# Patient Record
Sex: MALE | Race: WHITE | NOT HISPANIC OR LATINO | URBAN - METROPOLITAN AREA
[De-identification: names, ages, dates, MRNs, and addresses within clinical notes are randomized per-mention and may not be internally consistent; named-entity substitution may affect disease eponyms.]

---

## 2017-04-26 ENCOUNTER — TRANSCRIBE ORDERS (OUTPATIENT)
Dept: ADMINISTRATIVE | Facility: HOSPITAL | Age: 82
End: 2017-04-26

## 2017-04-26 DIAGNOSIS — R91.8 LUNG MASS: Primary | ICD-10-CM

## 2017-05-03 ENCOUNTER — HOSPITAL ENCOUNTER (OUTPATIENT)
Dept: CT IMAGING | Facility: HOSPITAL | Age: 82
Discharge: HOME | End: 2017-05-03
Attending: RADIOLOGY | Admitting: RADIOLOGY

## 2017-05-03 ENCOUNTER — TELEPHONE (OUTPATIENT)
Dept: INTERVENTIONAL RADIOLOGY/VASCULAR | Facility: HOSPITAL | Age: 82
End: 2017-05-03

## 2017-05-03 VITALS — HEIGHT: 67 IN | BODY MASS INDEX: 27.28 KG/M2 | WEIGHT: 173.8 LBS

## 2017-06-26 ENCOUNTER — OFFICE VISIT (OUTPATIENT)
Dept: CARDIAC SURGERY | Facility: CLINIC | Age: 82
End: 2017-06-26

## 2017-06-26 VITALS
SYSTOLIC BLOOD PRESSURE: 120 MMHG | WEIGHT: 169.4 LBS | BODY MASS INDEX: 26.59 KG/M2 | OXYGEN SATURATION: 97 % | HEART RATE: 70 BPM | DIASTOLIC BLOOD PRESSURE: 47 MMHG | TEMPERATURE: 98.1 F | HEIGHT: 67 IN

## 2017-06-26 DIAGNOSIS — R91.8 MASS OF RIGHT LUNG: Primary | ICD-10-CM

## 2017-06-26 PROCEDURE — 99203 OFFICE O/P NEW LOW 30 MIN: CPT | Performed by: THORACIC SURGERY (CARDIOTHORACIC VASCULAR SURGERY)

## 2017-06-26 RX ORDER — AMLODIPINE BESYLATE 10 MG/1
10 TABLET ORAL DAILY
COMMUNITY

## 2017-06-26 RX ORDER — ALLOPURINOL 100 MG/1
100 TABLET ORAL DAILY
COMMUNITY

## 2017-06-26 RX ORDER — PANTOPRAZOLE SODIUM 40 MG/1
40 TABLET, DELAYED RELEASE ORAL DAILY
COMMUNITY

## 2017-06-26 RX ORDER — ATORVASTATIN CALCIUM 40 MG/1
20 TABLET, FILM COATED ORAL DAILY
COMMUNITY

## 2017-06-26 RX ORDER — LEVOTHYROXINE SODIUM 0.07 MG/1
75 TABLET ORAL DAILY
COMMUNITY

## 2017-06-26 RX ORDER — ERGOCALCIFEROL 1.25 MG/1
50000 CAPSULE ORAL WEEKLY
COMMUNITY

## 2017-06-26 NOTE — PROGRESS NOTES
06/26/2017  Patient Information  Keny Krishnamurthy                                                                                          [address]  [place] [address]   1934  'PCP/Referring Physician'  No Known Provider  None  No ref. provider found    Chief Complaint   Patient presents with   • Lung Nodule     Referred by Dr. Hardin for pulmonary mass       History of Present Illness:  The patient is an 83 year old male who done recently presented to Dr. Hardin for iron deficiency anemia and was seen by Dr. Anguiano also on referral.  He saw Dr. Anguiano and underwent a biopsy which was nondiagnostic.  He has been a long-term smoker but has been quit for about 20 years.  He is been referred to me for  further workup and evaluation of his right lung mass.  He denies hemoptysis.  He has slight shortness breath.  He has no cough.      Patient Active Problem List   Diagnosis   • Mass of right lung     Past Medical History:   Diagnosis Date   • Arthritis    • COPD (chronic obstructive pulmonary disease)    • Disease of thyroid gland    • Hyperlipidemia    • Hypertension    • Pneumonia    • Skin cancer    • Stroke      Past Surgical History:   Procedure Laterality Date   • AORTIC VALVE SURGERY     • CHOLECYSTECTOMY         Current Outpatient Prescriptions:   •  allopurinol (ZYLOPRIM) 100 MG tablet, Take 100 mg by mouth Daily., Disp: , Rfl:   •  amLODIPine (NORVASC) 10 MG tablet, Take 10 mg by mouth Daily., Disp: , Rfl:   •  atorvastatin (LIPITOR) 40 MG tablet, Take 20 mg by mouth Daily., Disp: , Rfl:   •  levothyroxine (SYNTHROID, LEVOTHROID) 75 MCG tablet, Take 75 mcg by mouth Daily., Disp: , Rfl:   •  pantoprazole (PROTONIX) 40 MG EC tablet, Take 40 mg by mouth Daily., Disp: , Rfl:   •  vitamin D (ERGOCALCIFEROL) 36407 UNITS capsule capsule, Take 50,000 Units by mouth 1 (One) Time Per Week., Disp: , Rfl:   No Known Allergies  Social History     Social History   • Marital status:      Spouse name: N/A   •  Number of children: 6   • Years of education: N/A     Occupational History   • [employer] Retired     Social History Main Topics   • Smoking status: Former Smoker     Packs/day: 1.00     Years: 20.00     Types: Cigarettes     Quit date: 6/26/1997   • Smokeless tobacco: Never Used   • Alcohol use No      Comment: Quit 15 years ago   • Drug use: No   • Sexual activity: Not on file     Other Topics Concern   • Not on file     Social History Narrative    Lives in [place], [place] outside of [facility]     Family History   Problem Relation Age of Onset   • Heart attack Mother    • Leukemia Father    • Cancer Brother      Review of Systems   Constitution: Positive for weakness and weight loss (13 lbs). Negative for chills, fever, malaise/fatigue and night sweats.   HENT: Positive for hearing loss. Negative for headaches, odynophagia and sore throat.    Cardiovascular: Positive for dyspnea on exertion and leg swelling. Negative for chest pain, orthopnea and palpitations.   Respiratory: Positive for cough and shortness of breath. Negative for hemoptysis.    Endocrine: Negative for cold intolerance, heat intolerance, polydipsia, polyphagia and polyuria.   Hematologic/Lymphatic: Bruises/bleeds easily.   Skin: Negative for itching and rash.   Musculoskeletal: Negative for joint pain, joint swelling and myalgias.   Gastrointestinal: Negative for abdominal pain, constipation, diarrhea, hematemesis, hematochezia, melena, nausea and vomiting.   Genitourinary: Negative for dysuria, frequency and hematuria.   Neurological: Positive for dizziness. Negative for focal weakness, numbness and seizures.   Psychiatric/Behavioral: Negative for depression and suicidal ideas. The patient is not nervous/anxious.    All other systems reviewed and are negative.    Vitals:    06/26/17 1429   BP: 120/47   BP Location: Right arm   Patient Position: Sitting   Pulse: 70   Temp: 98.1 °F (36.7 °C)   SpO2: 97%   Weight: 169 lb 6.4 oz (76.8 kg)  "  Height: 67\" (170.2 cm)      Physical Exam   Constitutional: He is oriented to person, place, and time. He appears well-developed and well-nourished. No distress.   HENT:   Head: Normocephalic.   Eyes: Conjunctivae and EOM are normal. Pupils are equal, round, and reactive to light.   Neck: Normal range of motion. Carotid bruit is not present. No thyroid mass present.   Cardiovascular: Normal rate.  Exam reveals no gallop and no friction rub.    No murmur heard.  Pulmonary/Chest: Effort normal. He has no wheezes. He has no rales. He exhibits no tenderness.   Abdominal: Soft. He exhibits no distension. There is no tenderness.   Musculoskeletal: Normal range of motion.   Neurological: He is alert and oriented to person, place, and time.   Skin: Skin is warm and dry. No rash noted.   Psychiatric: He has a normal mood and affect.       Labs/Imaging:  I obtained and reviewed medical records from Dr. Hardin's office including the PET scan and records from Dr. Anguiano's office.    Assessment/Plan:   The patient is an 83 year old male with a lung mass.  I have obtained his PET scan x-rays and I have reviewed those.  He has approximately a 4 cm right lung mass which I concur with the radiologist's interpretation.  This appears to be cavitary in nature.  Also I reviewed his pulmonary function test and obtained these.  He appears to have at least moderate restrictive lung volumes.  I had a long discussion with him and his son.  At this point I think the best option and an 83-year-old man would be to proceed with a transbronchial or EBUS to get further tissue.  If this is nondiagnostic a needle biopsy could be entertained.  They appeared to be fully informed of all the above and agree with the plans.      Patient Active Problem List   Diagnosis   • Mass of right lung     Signed by: Vamsi Iglesias M.D.    6/26/2017    CC:  MD Vidya Cornejo, , editing for Vamsi Iglesias M.D.    I, Vamsi " MD Harris, have read and agree with the editing done by Manju Beckwith, .

## 2017-07-20 ENCOUNTER — TELEPHONE (OUTPATIENT)
Dept: CARDIAC SURGERY | Facility: CLINIC | Age: 82
End: 2017-07-20

## 2017-07-20 NOTE — TELEPHONE ENCOUNTER
Mr. Krishnamurthy did not keep his appointment for his EBUS procedure.  We have made multiple phone calls to reach him to see if he would like us to reschedule, but we don't get an answer.  I spoke with Ubaldo at Dr. Hardin's office and Mr. Krishnamurthy had an appt. With them on Monday.  She said he does not want to have the biopsy at this time. I have canceled all appointments with [facility], and told Ubaldo if he wishes to reschedule, they can call us back.